# Patient Record
Sex: FEMALE | URBAN - METROPOLITAN AREA
[De-identification: names, ages, dates, MRNs, and addresses within clinical notes are randomized per-mention and may not be internally consistent; named-entity substitution may affect disease eponyms.]

---

## 2017-01-18 ENCOUNTER — APPOINTMENT (RX ONLY)
Dept: URBAN - METROPOLITAN AREA CLINIC 348 | Facility: CLINIC | Age: 67
Setting detail: DERMATOLOGY
End: 2017-01-18

## 2017-01-18 DIAGNOSIS — L81.4 OTHER MELANIN HYPERPIGMENTATION: ICD-10-CM

## 2017-01-18 DIAGNOSIS — D485 NEOPLASM OF UNCERTAIN BEHAVIOR OF SKIN: ICD-10-CM

## 2017-01-18 PROBLEM — D48.5 NEOPLASM OF UNCERTAIN BEHAVIOR OF SKIN: Status: ACTIVE | Noted: 2017-01-18

## 2017-01-18 PROCEDURE — ? BIOPSY BY SHAVE METHOD

## 2017-01-18 PROCEDURE — 11100: CPT

## 2017-01-18 PROCEDURE — ? COUNSELING

## 2017-01-18 PROCEDURE — 99212 OFFICE O/P EST SF 10 MIN: CPT | Mod: 25

## 2017-01-18 ASSESSMENT — LOCATION SIMPLE DESCRIPTION DERM
LOCATION SIMPLE: LEFT UPPER ARM
LOCATION SIMPLE: ABDOMEN

## 2017-01-18 ASSESSMENT — LOCATION ZONE DERM
LOCATION ZONE: ARM
LOCATION ZONE: TRUNK

## 2017-01-18 ASSESSMENT — LOCATION DETAILED DESCRIPTION DERM
LOCATION DETAILED: LEFT ANTECUBITAL SKIN
LOCATION DETAILED: RIGHT RIB CAGE

## 2017-01-18 NOTE — PROCEDURE: BIOPSY BY SHAVE METHOD
Bill 61655 For Specimen Handling/Conveyance To Laboratory?: no
Biopsy Method: Double edge Personna blades
Billing Type: Third-Party Bill
Electrodesiccation Text: The wound bed was treated with electrodesiccation after the biopsy was performed.
X Size Of Lesion In Cm: 0
Post-Care Instructions: I reviewed with the patient in detail post-care instructions. Patient is to keep the biopsy site dry overnight, and then apply bacitracin twice daily until healed. Patient may apply hydrogen peroxide soaks to remove any crusting.
Type Of Destruction Used: Curettage
Cryotherapy Text: The wound bed was treated with cryotherapy after the biopsy was performed.
Detail Level: Detailed
Anesthesia Type: 1% lidocaine without epinephrine
Curettage Text: The wound bed was treated with curettage after the biopsy was performed.
Silver Nitrate Text: The wound bed was treated with silver nitrate after the biopsy was performed.
Notification Instructions: Patient to return to clinic in 2-4 weeks for biopsy results. Pt understands results will not be given over the phone.
Hemostasis: Aluminum Chloride
Anesthesia Volume In Cc (Will Not Render If 0): 1
Biopsy Type: H and E
Electrodesiccation And Curettage Text: The wound bed was treated with electrodesiccation and curettage after the biopsy was performed.
Dressing: bandage
Wound Care: Aquaphor
Consent: Written consent was obtained and risks were reviewed including but not limited to scarring, infection, bleeding, scabbing, incomplete removal, nerve damage and allergy to anesthesia.

## 2017-05-16 ENCOUNTER — APPOINTMENT (RX ONLY)
Dept: URBAN - METROPOLITAN AREA CLINIC 348 | Facility: CLINIC | Age: 67
Setting detail: DERMATOLOGY
End: 2017-05-16

## 2017-05-16 DIAGNOSIS — L81.4 OTHER MELANIN HYPERPIGMENTATION: ICD-10-CM

## 2017-05-16 DIAGNOSIS — L82.1 OTHER SEBORRHEIC KERATOSIS: ICD-10-CM

## 2017-05-16 PROCEDURE — ? COUNSELING

## 2017-05-16 PROCEDURE — 17110 DESTRUCTION B9 LES UP TO 14: CPT

## 2017-05-16 PROCEDURE — ? BENIGN DESTRUCTION

## 2017-05-16 PROCEDURE — 99213 OFFICE O/P EST LOW 20 MIN: CPT | Mod: 25

## 2017-05-16 ASSESSMENT — LOCATION DETAILED DESCRIPTION DERM
LOCATION DETAILED: LEFT DISTAL DORSAL FOREARM
LOCATION DETAILED: RIGHT PROXIMAL DORSAL FOREARM
LOCATION DETAILED: LEFT MEDIAL SUPERIOR CHEST
LOCATION DETAILED: RIGHT RIB CAGE

## 2017-05-16 ASSESSMENT — LOCATION SIMPLE DESCRIPTION DERM
LOCATION SIMPLE: LEFT FOREARM
LOCATION SIMPLE: ABDOMEN
LOCATION SIMPLE: RIGHT FOREARM
LOCATION SIMPLE: CHEST

## 2017-05-16 ASSESSMENT — LOCATION ZONE DERM
LOCATION ZONE: TRUNK
LOCATION ZONE: ARM

## 2017-05-16 NOTE — PROCEDURE: BENIGN DESTRUCTION
Medical Necessity Clause: This procedure was medically necessary because the lesions that were treated were:
Include Z78.9 (Other Specified Conditions Influencing Health Status) As An Associated Diagnosis?: No
Consent: The patient's consent was obtained including but not limited to risks of crusting, scabbing, blistering, scarring, darker or lighter pigmentary change, recurrence, incomplete removal and infection.
Detail Level: Detailed
Anesthesia Volume In Cc: 0.5
Post-Care Instructions: I reviewed with the patient in detail post-care instructions. Patient is to wear sunprotection, and avoid picking at any of the treated lesions. Pt may apply Vaseline to crusted or scabbing areas.
Treatment Number (Will Not Render If 0): 0
Medical Necessity Information: It is in your best interest to select a reason for this procedure from the list below. All of these items fulfill various CMS LCD requirements except the new and changing color options.

## 2018-08-22 ENCOUNTER — IMPORTED ENCOUNTER (OUTPATIENT)
Dept: URBAN - METROPOLITAN AREA CLINIC 59 | Facility: CLINIC | Age: 68
End: 2018-08-22

## 2018-08-22 PROBLEM — H04.123 TEAR FILM INSUFFICIENCY OF BILATERAL LACRIMAL GLANDS: Noted: 2018-08-22

## 2018-08-22 PROBLEM — H43.813 VITREOUS DEGENERATION, BILATERAL: Noted: 2018-08-22

## 2018-08-22 PROBLEM — H25.13 BILATERAL NUCLEAR SCLEROSIS CATARACTS: Noted: 2018-08-22

## 2018-08-22 PROCEDURE — 99204 OFFICE O/P NEW MOD 45 MIN: CPT

## 2018-10-03 ENCOUNTER — IMPORTED ENCOUNTER (OUTPATIENT)
Dept: URBAN - METROPOLITAN AREA CLINIC 59 | Facility: CLINIC | Age: 68
End: 2018-10-03

## 2018-10-03 PROBLEM — H25.13 BILATERAL NUCLEAR SCLEROSIS CATARACTS: Noted: 2018-10-03

## 2018-10-03 PROBLEM — H04.123 TEAR FILM INSUFFICIENCY OF BILATERAL LACRIMAL GLANDS: Noted: 2018-10-03

## 2018-10-03 PROBLEM — H43.813 VITREOUS DEGENERATION, BILATERAL: Noted: 2018-10-03

## 2018-10-03 PROCEDURE — 92014 COMPRE OPH EXAM EST PT 1/>: CPT

## 2018-10-03 PROCEDURE — 92015 DETERMINE REFRACTIVE STATE: CPT

## 2023-10-15 ASSESSMENT — VISUAL ACUITY
OS_CC: 20/25+2
OD_CC: 20/20-2
OS_CC: 20/20-1
OD_CC: 20/25+2

## 2023-10-15 ASSESSMENT — TONOMETRY
OD_IOP_MMHG: 14
OD_IOP_MMHG: 14
OS_IOP_MMHG: 14
OS_IOP_MMHG: 14